# Patient Record
(demographics unavailable — no encounter records)

---

## 2025-04-29 NOTE — DISCUSSION/SUMMARY
[FreeTextEntry1] : well woman  happy w OCP for BC std screen--no longer using condoms w BF recent trip to [place] and [place] for anniversary 
Negative

## 2025-04-29 NOTE — PHYSICAL EXAM
[Chaperone Declined] : Chaperone offered however refused by patient, [Appropriately responsive] : appropriately responsive [Alert] : alert [No Acute Distress] : no acute distress [No Lymphadenopathy] : no lymphadenopathy [Soft] : soft [Non-tender] : non-tender [Non-distended] : non-distended [No HSM] : No HSM [No Lesions] : no lesions [No Mass] : no mass [Oriented x3] : oriented x3 [Examination Of The Breasts] : a normal appearance [No Masses] : no breast masses were palpable [Labia Majora] : normal [Labia Minora] : normal [Normal] : normal [Uterine Adnexae] : normal